# Patient Record
Sex: MALE | Race: ASIAN | Employment: OTHER | URBAN - METROPOLITAN AREA
[De-identification: names, ages, dates, MRNs, and addresses within clinical notes are randomized per-mention and may not be internally consistent; named-entity substitution may affect disease eponyms.]

---

## 2018-01-29 PROCEDURE — 82570 ASSAY OF URINE CREATININE: CPT | Performed by: INTERNAL MEDICINE

## 2018-01-29 PROCEDURE — 82043 UR ALBUMIN QUANTITATIVE: CPT | Performed by: INTERNAL MEDICINE

## 2018-05-17 PROCEDURE — 82570 ASSAY OF URINE CREATININE: CPT | Performed by: INTERNAL MEDICINE

## 2018-05-17 PROCEDURE — 82043 UR ALBUMIN QUANTITATIVE: CPT | Performed by: INTERNAL MEDICINE

## 2025-02-10 ENCOUNTER — OFFICE VISIT (OUTPATIENT)
Facility: LOCATION | Age: 65
End: 2025-02-10
Payer: MEDICAID

## 2025-02-10 DIAGNOSIS — H60.8X3 CHRONIC ECZEMATOUS OTITIS EXTERNA OF BOTH EARS: ICD-10-CM

## 2025-02-10 DIAGNOSIS — H61.23 BILATERAL IMPACTED CERUMEN: Primary | ICD-10-CM

## 2025-02-10 RX ORDER — FLUOCINOLONE ACETONIDE 0.11 MG/ML
3 OIL AURICULAR (OTIC) 3 TIMES DAILY
Qty: 20 ML | Refills: 2 | Status: SHIPPED | OUTPATIENT
Start: 2025-02-10 | End: 2025-02-17

## 2025-02-10 NOTE — PROGRESS NOTES
Justino Metcalf is a 64 year old male.   Chief Complaint   Patient presents with    Cerumen Impaction     HPI:   64-year-old male with wax impaction also itchy ears no otorrhea otalgia vertigo tinnitus  Current Outpatient Medications   Medication Sig Dispense Refill    SITagliptin-MetFORMIN HCl (JANUMET)  MG Oral Tab TAKE 1 TABLET TWICE A DAY WITH MEALS 180 tablet 0    glimepiride 4 MG Oral Tab Take 1 tablet (4 mg total) by mouth once daily. 90 tablet 1    ramipril 5 MG Oral Cap Take 1 capsule (5 mg total) by mouth once daily. 90 capsule 1    Sildenafil Citrate (VIAGRA) 100 MG Oral Tab TAKE 1 TABLET BY MOUTH AS NEEDED FOR ERECTILE DYSFUNCTION. 24 tablet 4    Glucose Blood (ONETOUCH ULTRA BLUE) In Vitro Strip Test once daily 100 each 3    Halobetasol Propionate (ULTRAVATE) 0.05 % Apply Externally Ointment Apply daily as needed 50 g 5      Past Medical History:    DIABETES      Social History:  Social History     Socioeconomic History    Marital status:    Tobacco Use    Smoking status: Never    Smokeless tobacco: Never   Substance and Sexual Activity    Alcohol use: No     Alcohol/week: 0.0 standard drinks of alcohol    Drug use: No   Other Topics Concern    Seat Belt Yes     Social Drivers of Health     Food Insecurity: No Food Insecurity (9/19/2023)    Received from [facility]    Food Insecurity     Currently or in the past 3 months, have you worried your food would run out before you had money to buy more?: No     In the past 12 months, have you run out of food or been unable to get more?: No   Transportation Needs: No Transportation Needs (9/19/2023)    Received from [facility]    Transportation Needs     Currently or in the past 3 months, has lack of transportation kept you from medical appointments, getting food or medicine, or providing care to a family member?: Unrecognized value     Medical Transportation Needs?: No      History reviewed. No pertinent  surgical history.      REVIEW OF SYSTEMS:   GENERAL HEALTH: feels well otherwise  GENERAL : denies fever, chills, sweats, weight loss, weight gain  SKIN: denies any unusual skin lesions or rashes  RESPIRATORY: denies shortness of breath with exertion  NEURO: denies headaches    EXAM:   There were no vitals taken for this visit.    System Pertinent findings Details   Constitutional  Overall appearance - Normal.   Psychiatric  Orientation - Oriented to time, place, person & situation. Appropriate mood and affect.   Head/Face  Facial features -- Normal. Skull - Normal.   Eyes  Pupils equal ,round ,react to light and accomidate   Ears  External Ear Right: Normal, Left: Normal. Canal - Right: Normal, Left: Normal. TM - Right: Wax removed TM normal has more of a flaky kind of wax left: Wax removed TM normal more flaky type wax   Nose  External Nose, Normal, Septum -midline nasal Vault, clear,Turbinates - Right: Normal left: Normal   Mouth/Throat  Lips/teeth/gums - Normal. Tonsils -1+. Oropharynx - Normal.   Neck Exam  Inspection - Normal. Palpation - Normal. Parotid gland - Normal. Thyroid gland -normal   Neurological  Cranial nerves - Cranial nerves II through XII grossly intact.   Nasopharynx   Normal        Lymph Detail  Submental. Submandibular. Anterior cervical. Posterior cervical. Supraclavicular.     Patients exam showed wax impaction right ear, wax impaction left ear. Ear wax was removed under the operative microscope. No complications. Patient tolerated the procedure well. Ear exam findings listed in note after removal.    ASSESSMENT AND PLAN:   1. Bilateral impacted cerumen  As needed follow-up    2. Chronic eczematous otitis externa of both ears  Derm otic eardrops use as directed      The patient indicates understanding of these issues and agrees to the plan.      Evan Kilgore MD  2/10/2025  2:51 PM

## 2025-02-18 ENCOUNTER — MED REC SCAN ONLY (OUTPATIENT)
Facility: LOCATION | Age: 65
End: 2025-02-18

## 2025-02-20 ENCOUNTER — TELEPHONE (OUTPATIENT)
Facility: LOCATION | Age: 65
End: 2025-02-20

## 2025-02-20 NOTE — TELEPHONE ENCOUNTER
Denials for Fluocinolone Acetonide 0.01 % Otic Oil was faxed. Faxed stated that further criteria needs to be met for an approval. An appeal can be filed. If appeal is filed by phone or writing. An appeal by the phone will still need to send a written and signed appeal request. To appoint someone to make an appeal a Authorized Representative Designation form must be filled out. This form can be found at website [email or web address] or by calling member services at [phone]. An appeal must be within 60 calendar days of the date of this letter (02/18/25). Can file the appeal at fax number: [phone] or phone number [phone]. Tracking #: [id number]